# Patient Record
Sex: MALE | Race: WHITE | NOT HISPANIC OR LATINO | URBAN - METROPOLITAN AREA
[De-identification: names, ages, dates, MRNs, and addresses within clinical notes are randomized per-mention and may not be internally consistent; named-entity substitution may affect disease eponyms.]

---

## 2023-06-06 DIAGNOSIS — Z86.39 PERSONAL HISTORY OF OTHER ENDOCRINE, NUTRITIONAL AND METABOLIC DISEASE: ICD-10-CM

## 2023-06-13 RX ORDER — ATORVASTATIN CALCIUM 80 MG/1
TABLET, FILM COATED ORAL
Qty: 90 TABLET | Refills: 2 | Status: SHIPPED | OUTPATIENT
Start: 2023-06-13 | End: 2024-03-04

## 2023-07-06 DIAGNOSIS — E55.9 VITAMIN D DEFICIENCY: ICD-10-CM

## 2023-07-06 DIAGNOSIS — R31.1 BENIGN ESSENTIAL MICROSCOPIC HEMATURIA: ICD-10-CM

## 2023-07-06 DIAGNOSIS — E78.01 FAMILIAL HYPERCHOLESTEROLEMIA: Primary | ICD-10-CM

## 2023-07-06 PROBLEM — R79.89 LOW VITAMIN D LEVEL: Status: ACTIVE | Noted: 2023-07-06

## 2023-07-06 PROBLEM — K21.9 GERD (GASTROESOPHAGEAL REFLUX DISEASE): Status: ACTIVE | Noted: 2023-07-06

## 2023-07-06 PROBLEM — R31.9 HEMATURIA: Status: ACTIVE | Noted: 2023-07-06

## 2023-07-07 ENCOUNTER — LAB (OUTPATIENT)
Dept: LAB | Facility: LAB | Age: 73
End: 2023-07-07
Payer: MEDICARE

## 2023-07-07 DIAGNOSIS — R31.1 BENIGN ESSENTIAL MICROSCOPIC HEMATURIA: ICD-10-CM

## 2023-07-07 DIAGNOSIS — E55.9 VITAMIN D DEFICIENCY: ICD-10-CM

## 2023-07-07 DIAGNOSIS — E78.01 FAMILIAL HYPERCHOLESTEROLEMIA: ICD-10-CM

## 2023-07-07 LAB
ALANINE AMINOTRANSFERASE (SGPT) (U/L) IN SER/PLAS: 33 U/L (ref 10–52)
ALBUMIN (G/DL) IN SER/PLAS: 4.1 G/DL (ref 3.4–5)
ALKALINE PHOSPHATASE (U/L) IN SER/PLAS: 77 U/L (ref 33–136)
ANION GAP IN SER/PLAS: 11 MMOL/L (ref 10–20)
ASPARTATE AMINOTRANSFERASE (SGOT) (U/L) IN SER/PLAS: 33 U/L (ref 9–39)
BASOPHILS (10*3/UL) IN BLOOD BY AUTOMATED COUNT: 0.07 X10E9/L (ref 0–0.1)
BASOPHILS/100 LEUKOCYTES IN BLOOD BY AUTOMATED COUNT: 1.4 % (ref 0–2)
BILIRUBIN TOTAL (MG/DL) IN SER/PLAS: 1.5 MG/DL (ref 0–1.2)
C REACTIVE PROTEIN (MG/L) IN SER/PLAS BY HIGH SENSIT: 1.6 MG/L
CALCIDIOL (25 OH VITAMIN D3) (NG/ML) IN SER/PLAS: 76 NG/ML
CALCIUM (MG/DL) IN SER/PLAS: 9.3 MG/DL (ref 8.6–10.6)
CARBON DIOXIDE, TOTAL (MMOL/L) IN SER/PLAS: 28 MMOL/L (ref 21–32)
CHLORIDE (MMOL/L) IN SER/PLAS: 106 MMOL/L (ref 98–107)
CHOLESTEROL (MG/DL) IN SER/PLAS: 136 MG/DL (ref 0–199)
CHOLESTEROL IN HDL (MG/DL) IN SER/PLAS: 48.7 MG/DL
CHOLESTEROL/HDL RATIO: 2.8
COBALAMIN (VITAMIN B12) (PG/ML) IN SER/PLAS: 508 PG/ML (ref 211–911)
CREATININE (MG/DL) IN SER/PLAS: 1.07 MG/DL (ref 0.5–1.3)
EOSINOPHILS (10*3/UL) IN BLOOD BY AUTOMATED COUNT: 0.25 X10E9/L (ref 0–0.4)
EOSINOPHILS/100 LEUKOCYTES IN BLOOD BY AUTOMATED COUNT: 5 % (ref 0–6)
ERYTHROCYTE DISTRIBUTION WIDTH (RATIO) BY AUTOMATED COUNT: 13 % (ref 11.5–14.5)
ERYTHROCYTE MEAN CORPUSCULAR HEMOGLOBIN CONCENTRATION (G/DL) BY AUTOMATED: 34.1 G/DL (ref 32–36)
ERYTHROCYTE MEAN CORPUSCULAR VOLUME (FL) BY AUTOMATED COUNT: 90 FL (ref 80–100)
ERYTHROCYTES (10*6/UL) IN BLOOD BY AUTOMATED COUNT: 5.12 X10E12/L (ref 4.5–5.9)
GFR MALE: 73 ML/MIN/1.73M2
GLUCOSE (MG/DL) IN SER/PLAS: 92 MG/DL (ref 74–99)
HEMATOCRIT (%) IN BLOOD BY AUTOMATED COUNT: 46.3 % (ref 41–52)
HEMOGLOBIN (G/DL) IN BLOOD: 15.8 G/DL (ref 13.5–17.5)
IMMATURE GRANULOCYTES/100 LEUKOCYTES IN BLOOD BY AUTOMATED COUNT: 0.2 % (ref 0–0.9)
LDL: 66 MG/DL (ref 0–99)
LEUKOCYTES (10*3/UL) IN BLOOD BY AUTOMATED COUNT: 5 X10E9/L (ref 4.4–11.3)
LYMPHOCYTES (10*3/UL) IN BLOOD BY AUTOMATED COUNT: 1.26 X10E9/L (ref 0.8–3)
LYMPHOCYTES/100 LEUKOCYTES IN BLOOD BY AUTOMATED COUNT: 25.4 % (ref 13–44)
MONOCYTES (10*3/UL) IN BLOOD BY AUTOMATED COUNT: 0.55 X10E9/L (ref 0.05–0.8)
MONOCYTES/100 LEUKOCYTES IN BLOOD BY AUTOMATED COUNT: 11.1 % (ref 2–10)
NEUTROPHILS (10*3/UL) IN BLOOD BY AUTOMATED COUNT: 2.82 X10E9/L (ref 1.6–5.5)
NEUTROPHILS/100 LEUKOCYTES IN BLOOD BY AUTOMATED COUNT: 56.9 % (ref 40–80)
NRBC (PER 100 WBCS) BY AUTOMATED COUNT: 0 /100 WBC (ref 0–0)
PLATELETS (10*3/UL) IN BLOOD AUTOMATED COUNT: 278 X10E9/L (ref 150–450)
POTASSIUM (MMOL/L) IN SER/PLAS: 4.3 MMOL/L (ref 3.5–5.3)
PROSTATE SPECIFIC AG (NG/ML) IN SER/PLAS: 1.8 NG/ML (ref 0–4)
PROTEIN TOTAL: 6.6 G/DL (ref 6.4–8.2)
SODIUM (MMOL/L) IN SER/PLAS: 141 MMOL/L (ref 136–145)
THYROTROPIN (MIU/L) IN SER/PLAS BY DETECTION LIMIT <= 0.05 MIU/L: 3.99 MIU/L (ref 0.44–3.98)
TRIGLYCERIDE (MG/DL) IN SER/PLAS: 105 MG/DL (ref 0–149)
UREA NITROGEN (MG/DL) IN SER/PLAS: 17 MG/DL (ref 6–23)
VLDL: 21 MG/DL (ref 0–40)

## 2023-07-07 PROCEDURE — 85025 COMPLETE CBC W/AUTO DIFF WBC: CPT

## 2023-07-07 PROCEDURE — 80053 COMPREHEN METABOLIC PANEL: CPT

## 2023-07-07 PROCEDURE — 86141 C-REACTIVE PROTEIN HS: CPT

## 2023-07-07 PROCEDURE — 84443 ASSAY THYROID STIM HORMONE: CPT

## 2023-07-07 PROCEDURE — 82607 VITAMIN B-12: CPT

## 2023-07-07 PROCEDURE — 84153 ASSAY OF PSA TOTAL: CPT

## 2023-07-07 PROCEDURE — 80061 LIPID PANEL: CPT

## 2023-07-07 PROCEDURE — 36415 COLL VENOUS BLD VENIPUNCTURE: CPT

## 2023-07-07 PROCEDURE — 82306 VITAMIN D 25 HYDROXY: CPT

## 2023-07-08 PROBLEM — R97.20 ELEVATED PROSTATE SPECIFIC ANTIGEN (PSA): Status: ACTIVE | Noted: 2021-12-08

## 2023-07-08 PROBLEM — R42 VERTIGO: Status: ACTIVE | Noted: 2023-07-08

## 2023-07-08 PROBLEM — M25.569 KNEE PAIN: Status: ACTIVE | Noted: 2023-07-08

## 2023-07-08 PROBLEM — M17.11 PRIMARY OSTEOARTHRITIS OF RIGHT KNEE: Status: ACTIVE | Noted: 2023-07-08

## 2023-07-08 PROBLEM — N42.0 STONES, PROSTATE: Status: ACTIVE | Noted: 2023-02-12

## 2023-07-08 PROBLEM — R33.9 INCOMPLETE BLADDER EMPTYING: Status: ACTIVE | Noted: 2021-12-08

## 2023-07-08 RX ORDER — FAMOTIDINE 20 MG/1
20 TABLET, FILM COATED ORAL 2 TIMES DAILY
COMMUNITY
End: 2023-07-10 | Stop reason: DRUGHIGH

## 2023-07-08 RX ORDER — AZELASTINE 1 MG/ML
2 SPRAY, METERED NASAL 2 TIMES DAILY
COMMUNITY
Start: 2021-10-29 | End: 2023-07-10 | Stop reason: ALTCHOICE

## 2023-07-08 RX ORDER — TADALAFIL 20 MG/1
20 TABLET ORAL
COMMUNITY
Start: 2023-06-07

## 2023-07-08 RX ORDER — ALUMINUM ZIRCONIUM OCTACHLOROHYDREX GLY 16 G/100G
1 GEL TOPICAL DAILY
COMMUNITY
Start: 2018-05-30

## 2023-07-08 RX ORDER — ESOMEPRAZOLE MAGNESIUM 20 MG/1
1 TABLET, DELAYED RELEASE ORAL DAILY
COMMUNITY
Start: 2021-06-07

## 2023-07-08 RX ORDER — FINASTERIDE 5 MG/1
5 TABLET, FILM COATED ORAL
COMMUNITY
Start: 2023-04-06

## 2023-07-08 RX ORDER — FLUTICASONE PROPIONATE 50 MCG
2 SPRAY, SUSPENSION (ML) NASAL
COMMUNITY
Start: 2011-10-13

## 2023-07-08 RX ORDER — FAMCICLOVIR 250 MG/1
250 TABLET ORAL DAILY
COMMUNITY
Start: 2023-06-09

## 2023-07-08 NOTE — PROGRESS NOTES
Subjective   Reason for Visit: Anselmo Lin is an 72 y.o. male here for a Medicare Wellness visit.               HPI  Lab work obtained last week was reviewed.  The patient reports a history of intermittent brief episodes of discomfort in the right shoulder region over the past several months.  He reports that the episodes only occur if he tries to throw hard overhand.  He reports no associated symptoms.    He also reports a history of constant swelling located at the proximal end of the left second finger since March 2023.  He reports that the swelling developed when he heard a pop in the finger while he was pulling an object tightly.  He reports no associated pain.  He reports no dysuria, urgency, frequency, hesitancy, interruption of stream, postvoid dribbling since being treated for urinary tract infection in mid May.    Over the past year, he still reports continued chronic occasional brief episodes of sharp pain over the medial surface of the right knee.  He reports that the episodes only occur with certain movements of the leg.  He reports that the episodes resolve immediately when he changes position.  He reports no associated symptoms and no change in the frequency or intensity of the episodes over the past year.  No other new complaints.  Patient Care Team:  Andrea Javier MD as PCP - General  Andrea Javier MD as PCP [facility]     Review of Systems  All systems have been reviewed and are normal except as previously noted  Objective   Vitals:  There were no vitals taken for this visit.      Physical Exam  Head-palpation revealed no tenderness over the maxillary or frontal sinuses.   Eyes - extraocular movements intact, pupils equal and reactive to light, fundi revealed good retinal color, no hemorrhages or exudates  Ears - palpation of pinnas and traguses revealed no tenderness. External auditory canals not erythematous or swollen. TMs clear.  Nose - turbinates not erythematous or  swollen; mild nasal septal deviation noted to the left.   Mouth - posterior pharynx is erythematous, not swollen. Tonsillar pillars appeared normal no exudates  Neck - no lymphadenopathy. Thyroid gland not enlarged. , No bruits  Lungs - clear to auscultation bilaterally  Cardiac - rate normal, rhythm regular no murmurs no JVD  Abdomen - soft nondistended normal active bowel sounds.  There is a nontender 6 x 6 cm mass in the epigastrium...  Palpation revealed no tenderness or increase in warmth.  No tenderness or masses noted throughout the rest of the abdomen liver percussed to 9cm in total span  Pulses 2+ bilaterally  Extremities - no peripheral edema  Musculoskeletal  MCP and PIP joints of the left second finger.  There is mild swelling located between both joints.  No erythema noted.  No swelling in either of the joints noted.  Full range of motion in all directions of motion with no pain.  Palpation revealed no tenderness or increase in warmth  Right shoulder-no erythema or swelling.  Full range of motion in all directions of motion  with no pain.  Palpation revealed no tenderness or increase in warmth.  Negative Duffy sign, negative arm crossover test, negative impingement sign, negative Yergason sign, negative Neer's sign, negative empty can test  Right knee-no erythema or swelling. Full range of motion in all directions of motion with no pain. Palpation did reveal crepitus but no tenderness or increase in warmth. Negative Lachemann's test, negative Edmund test, negative patellar apprehension test. No pain or laxity of the collateral ligaments noted.  Skin - yellowish discoloration of right first toenail noted.  Varicose veins noted in the lower extremities bilaterally; palpation also revealed a nontender slightly movable 1 x 1 cm nodule located over the anterior surface of the thigh no increase in warmth    Neurologic  Mental status-alert and oriented x3   Cranial nerves-2 through 12 grossly intact, no  visual field abnormalities  Motor-no pronator drift noted, strength-5/5 in all muscle groups tested, , no tremor noted.  No bradykinesia noted.  No rigidity noted.  Negative pull test  Sensory-Light touch sensation fully intact  Pinprick sensation fully intact  Vibratory sensation fully intact  Cerebellar-no truncal ataxia, good coordination finger-nose testing,, good coordination heel-to-shin testing, normal rapid alternating movements  Romberg negative, no abnormality in tandem gait  Reflexes-2+/4 knee jerks, all other reflexes tested 1+/4 bilaterally  Assessment/Plan   Problem List Items Addressed This Visit    None       Assessment  Elevated CT coronary calcium score  Acute sinusitis left maxillary sinus December 2021  Chronic bilateral maxillary sinusitis December 2021  Gastroesophageal reflux  Camara's esophagus  Gastritis  Gilbert's syndrome  Chronic presence of a mass in the epigastrium-likely represents a ventral hernia  Colonic polyp August 2019  Hemorrhoids  BPH status post laser treatment October 2017; urinary retention status post greenlight photo vaporization of the prostate February 17, 2023  UTI 5/23  Urolithiasis  Nephrolithiasis  Massive acute on chronic rotator cuff tear left shoulder status post arthroscopic repair April 2017.  Long history of intermittent brief episodes of discomfort in the right shoulder region-May be secondary to osteoarthritis, labrum tear.  Labrum tear right shoulder  Osteoarthritis of the acromioclavicular joint right shoulder  Constant swelling noted between the MCP and PIP joints of the left second finger-may represent a torn flexor tendon.  Chronic intermittent brief episodes of sharp pain noted over the medial surface of the right knee-may be secondary to osteoarthritis  Osteoarthritis of the right knee  Anterior cruciate ligament tear right knee, meniscus tear right knee-remote  Vitamin D deficiency-mild  Chronic presence of a small mass located over the anterior  surface of the right thigh-may represent a cyst or a prominent varicose vein  Hyperlipidemia  Varicose veins  Bilateral neuroforaminal stenosis L1-L2, L4-L5, L5-S1  Degenerative disc disease of the lumbosacral spine  Onychomycosis of the right first toenail    Plan  The patient and I have elected to hold off on any referral to a hand specialist as he has experienced no episodes of pain in the left second finger since the injury in March.  Continue current medication regimen for now.  Patient should return for annual Medicare wellness visit in 1 year

## 2023-07-10 ENCOUNTER — OFFICE VISIT (OUTPATIENT)
Dept: PRIMARY CARE | Facility: CLINIC | Age: 73
End: 2023-07-10
Payer: MEDICARE

## 2023-07-10 VITALS
DIASTOLIC BLOOD PRESSURE: 80 MMHG | WEIGHT: 153 LBS | SYSTOLIC BLOOD PRESSURE: 110 MMHG | HEART RATE: 66 BPM | BODY MASS INDEX: 25.46 KG/M2

## 2023-07-10 DIAGNOSIS — Z00.00 ROUTINE GENERAL MEDICAL EXAMINATION AT HEALTH CARE FACILITY: ICD-10-CM

## 2023-07-10 DIAGNOSIS — E78.01 FAMILIAL HYPERCHOLESTEROLEMIA: Primary | ICD-10-CM

## 2023-07-10 DIAGNOSIS — G89.29 CHRONIC PAIN OF RIGHT KNEE: ICD-10-CM

## 2023-07-10 DIAGNOSIS — R31.1 BENIGN ESSENTIAL MICROSCOPIC HEMATURIA: ICD-10-CM

## 2023-07-10 DIAGNOSIS — K21.9 GASTROESOPHAGEAL REFLUX DISEASE WITHOUT ESOPHAGITIS: ICD-10-CM

## 2023-07-10 DIAGNOSIS — N13.8 BENIGN PROSTATIC HYPERPLASIA WITH URINARY OBSTRUCTION: ICD-10-CM

## 2023-07-10 DIAGNOSIS — M25.561 CHRONIC PAIN OF RIGHT KNEE: ICD-10-CM

## 2023-07-10 DIAGNOSIS — N40.1 BENIGN PROSTATIC HYPERPLASIA WITH URINARY OBSTRUCTION: ICD-10-CM

## 2023-07-10 PROCEDURE — 1170F FXNL STATUS ASSESSED: CPT | Performed by: INTERNAL MEDICINE

## 2023-07-10 PROCEDURE — 99212 OFFICE O/P EST SF 10 MIN: CPT | Performed by: INTERNAL MEDICINE

## 2023-07-10 PROCEDURE — 1159F MED LIST DOCD IN RCRD: CPT | Performed by: INTERNAL MEDICINE

## 2023-07-10 PROCEDURE — 1160F RVW MEDS BY RX/DR IN RCRD: CPT | Performed by: INTERNAL MEDICINE

## 2023-07-10 PROCEDURE — 1157F ADVNC CARE PLAN IN RCRD: CPT | Performed by: INTERNAL MEDICINE

## 2023-07-10 RX ORDER — FAMOTIDINE 20 MG/1
20 TABLET, FILM COATED ORAL NIGHTLY
Qty: 90 TABLET | Refills: 3 | Status: SHIPPED | OUTPATIENT
Start: 2023-07-10

## 2023-07-10 ASSESSMENT — ENCOUNTER SYMPTOMS
LOSS OF SENSATION IN FEET: 0
OCCASIONAL FEELINGS OF UNSTEADINESS: 0
DEPRESSION: 0

## 2023-07-10 ASSESSMENT — ACTIVITIES OF DAILY LIVING (ADL)
MANAGING_FINANCES: INDEPENDENT
GROCERY_SHOPPING: INDEPENDENT
DRESSING: INDEPENDENT
DOING_HOUSEWORK: INDEPENDENT
BATHING: INDEPENDENT
TAKING_MEDICATION: INDEPENDENT

## 2023-11-09 ENCOUNTER — OFFICE VISIT (OUTPATIENT)
Dept: DERMATOLOGY | Facility: CLINIC | Age: 73
End: 2023-11-09
Payer: MEDICARE

## 2023-11-09 DIAGNOSIS — L57.0 ACTINIC KERATOSIS: ICD-10-CM

## 2023-11-09 PROCEDURE — 17000 DESTRUCT PREMALG LESION: CPT | Performed by: SPECIALIST

## 2023-11-09 PROCEDURE — 1159F MED LIST DOCD IN RCRD: CPT | Performed by: SPECIALIST

## 2023-11-09 PROCEDURE — 17003 DESTRUCT PREMALG LES 2-14: CPT | Performed by: SPECIALIST

## 2023-11-09 PROCEDURE — 1160F RVW MEDS BY RX/DR IN RCRD: CPT | Performed by: SPECIALIST

## 2023-11-09 NOTE — PROGRESS NOTES
Subjective     Anselmo Lin is a 73 y.o. male who presents for the following: Actinic Keratosis (Scalp ).     Review of Systems:  No other skin or systemic complaints other than what is documented elsewhere in the note.    The following portions of the chart were reviewed this encounter and updated as appropriate:          Skin Cancer History  No skin cancer on file.      Specialty Problems    None       Objective   Well appearing patient in no apparent distress; mood and affect are within normal limits.    A focused skin examination was performed. All findings within normal limits unless otherwise noted below.    Assessment/Plan   1. Actinic keratosis  Scalp

## 2023-11-09 NOTE — PROGRESS NOTES
Subjective   HPI: Anselmo Lin is a 73 y.o. male is here for evaluation and treatment of sensitive spots on my scalp.    ROS: No other skin or systemic complaints other than what is documented elsewhere in the note.    ALLERGIES: Patient has no known allergies.    SOCIAL:  reports that he has never smoked. He has never used smokeless tobacco. He reports current alcohol use of about 2.0 - 3.0 standard drinks of alcohol per week. No history on file for drug use.    Objective     Description: Erythematous scaly sensitive lesions in a photodamaged area.    Assessment/Plan   1. Actinic keratosis  Scalp         FOLLOW UP: 6 months.    The patient was encouraged to contact me with any further questions or concerns.  Olayinka Tuttle MD  11/9/2023

## 2023-12-05 DIAGNOSIS — L30.8 OTHER ECZEMA: Primary | ICD-10-CM

## 2023-12-05 RX ORDER — BETAMETHASONE VALERATE 1 MG/G
CREAM TOPICAL 2 TIMES DAILY
Qty: 45 G | Refills: 0 | Status: SHIPPED | OUTPATIENT
Start: 2023-12-05

## 2024-01-31 ENCOUNTER — TELEPHONE (OUTPATIENT)
Dept: PRIMARY CARE | Facility: CLINIC | Age: 74
End: 2024-01-31
Payer: MEDICARE

## 2024-02-24 NOTE — PROGRESS NOTES
Priscilla Lawton MD   Adult Reconstruction and Joint Replacement Surgery  Phone: [phone]     Fax: [phone]     INITIAL CONSULTATION    Name: Anselmo Lin  : 1950  Date of Visit:  2024    CC: Right knee pain    Clinical History:  Anselmo Lin is a 73 y.o. male who presents with several years of RIGHT knee pain. They were referred by another patient of mine.    Patient has tried the following Ice, NSAIDs, Activity modification, Physical therapy, Xray, and Surgery . Date of last steroid injection: never. Patient does not have pain at night. Patient is able to walk unlimited blocks. Patient is currently using nothing as assistive device. Primarily complains of diffuse and medial pain. Patient has difficulty with descending stairs and walking on unlevel surfaces . The pain is significantly impacting his ability to perform activities of daily living. Patient reports no longer able to do activities such as descending stairs and keeping his knee stable.     He has a remote history of right tib fib fx s/p surgical fixation and subsequent malrotation. Also had a football injury with meniscal pathology and underwent meniscal procedure (meniscectomy, left ACL tear, at 38 yo. This did help knee giving out on him. Goes to gym every day to try keep up his strength.     Focused History  PMH: Reviewed and PE/DVT: no  PSH: Reviewed , Hip/Knee replacement: no, Hip/Knee surgery: R knee meniscectomy partial at 38 yo, Anesthesia complications: no, Spine surgery: no, Surgical infection: no, and Weight loss surgery: no  Meds: Reviewed, Current Anticoagulants: no, Weight loss medication: no, and Current Opioids: no  Allergies: Reviewed  and The patient reports no contraindications or allergies to cephalosporins, aspirin, NSAIDs or opioids, except as noted above.  FH: No family history of any bleeding or clotting disorders.  SHx: Reviewed, Occupation: retired, owned steel company locally, Current smoker: no, EtOH  intake weekly: n/a, Social support: n/a, and Preferred physical activities: staying fit, gym     PROMs   None     Past Medical History:   Diagnosis Date    Gilbert syndrome 06/08/2022    Gilbert's syndrome    Personal history of other diseases of male genital organs 06/08/2022    History of benign prostatic hyperplasia    Personal history of other diseases of the digestive system 02/03/2021    History of esophageal reflux    Personal history of other diseases of the respiratory system 10/29/2021    H/O acute sinusitis    Personal history of other endocrine, nutritional and metabolic disease 06/08/2022    History of hyperlipidemia     Documented in chart and reviewed.     No past surgical history on file.    Allergies: He has No Known Allergies.     Medications:  Current Outpatient Medications   Medication Instructions    atorvastatin (Lipitor) 80 mg tablet TAKE 1 TABLET BY MOUTH EVERY DAY    betamethasone valerate (Valisone) 0.1 % cream Topical, 2 times daily, To active areas as directed    Bifidobacterium infantis (Align) 4 mg capsule 1 capsule, oral, Daily    esomeprazole magnesium (NexIUM 24HR) 20 mg tablet,delayed release (DR/EC) 1 tablet, oral, Daily    famciclovir (FAMVIR) 250 mg, oral, Daily    famotidine (PEPCID) 20 mg, oral, Nightly    finasteride (PROSCAR) 5 mg, oral, Daily RT    fish oil concentrate (Omega-3) 120-180 mg capsule 1 capsule, oral, Daily    fluticasone (Flonase) 50 mcg/actuation nasal spray 2 sprays, Does not apply, Daily RT    tadalafil (CIALIS) 20 mg, oral       No family history on file.  Documented in chart and reviewed.     Social History     Tobacco Use    Smoking status: Never    Smokeless tobacco: Never   Substance Use Topics    Alcohol use: Yes     Alcohol/week: 2.0 - 3.0 standard drinks of alcohol     Types: 2 - 3 Standard drinks or equivalent per week       Review of Systems: Review of systems completed with medical assistant intake. Please refer to this note.     Falls: The  patient denies any recent falls or fall-related injuries.    Physical Exam:  BMI: Not determined    Constitutional: The patient is well-appearing and well groomed.     Neurological/Psychiatric: The patient is alert and oriented to person, place and time. The patient has a normal mood and affect.    Skin Examination: The skin over the right lower extremity, left lower extremity, right upper extremity, and left upper extremity is intact without any evidence of infection or rash.    Cardiovascular Examination: There are no varicosities and the skin is normal temperature, capillary refill normal, arterial pulses normal, no edema.    Lymphatic Examination: There is no lymphatic swelling or palpable lymph nodes present around the involved joint.    Neurological Examination: Bilateral lower extremities are grossly neurologically intact. Sensation normal, motor function normal.    Gait: The patient ambulates with an antalgic gait.     Lumbar spine:    No tenderness to palpation midline.    Negative straight leg raise bilaterally.    Right Hip Examination:  The skin is intact over the hip.    There is no tenderness over the greater trochanter.    Range of motion is full extension to 100 degrees of flexion.    The hip is stable without subluxation or dislocation.    The hip internally rotates to 15 degrees and externally rotates to 45 degrees.    There is no pain with hip motion.    Left Hip Examination:  The skin is intact over the hip.    There is no tenderness over the greater trochanter.    Range of motion is full extension to 100 degrees of flexion.    The hip is stable without subluxation or dislocation.    The hip internally rotates to 15 degrees and externally rotates to 45 degrees.    There is no pain with hip motion.    Right Knee Examination:  Examination of the knee reveals the skin to be intact.    There is a small effusion in the knee.    The alignment of the knee is Varus.    This deformity is not  "correctable.    There is tenderness to palpation over the joint line.    There is significant quadriceps atrophy.    Range of Motion: 15 to 115 degrees of flexion.    The knee is stable to varus-valgus stress and anterior-posterior stress with 4-5 mm of anterior laxity.     There is severe grinding with range of motion.    There is severe patellofemoral crepitus.    Left Knee Examination:  Examination of the knee reveals the skin to be intact.     There is no obvious swelling.    There is a no effusion in the knee.     The alignment of the knee is normal.    There is no tenderness to palpation over the joint line.    There is no significant quadriceps atrophy.    Range of motion is full extension to 120 degrees of flexion.    The knee is stable to varus-valgus stress and anterior-posterior stress.     There is moderate grinding with range of motion.    There is moderate patellofemoral crepitus.    Prior Labs:   Lab Results   Component Value Date    WBC 5.0 07/07/2023    HGB 15.8 07/07/2023    HCT 46.3 07/07/2023    MCV 90 07/07/2023     07/07/2023      No results found for: \"INR\", \"PROTIME\"      Lab Results   Component Value Date    GLUCOSE 92 07/07/2023    CALCIUM 9.3 07/07/2023     07/07/2023    K 4.3 07/07/2023    CO2 28 07/07/2023     07/07/2023    BUN 17 07/07/2023    CREATININE 1.07 07/07/2023      No results found for: \"CKTOTAL\", \"CKMB\", \"CKMBINDEX\", \"TROPONINI\"   No results found for: \"HGBA1C\"      No results found for: \"CRP\"   No results found for: \"SEDRATE\"     Radiographs:  Radiographs were personally reviewed today. There is evidence of severe RIGHT  knee osteoarthritis with MEDIAL and LATERAL  bone on bone apposition.    Impression:  73 y.o. male presents with severe RIGHT  knee osteoarthritis with bone on bone apposition. Patient has tried and failed appropriate conservative measures and now has limitation in ADL's.     Diagnosis:  Primary osteoarthritis of right " knee    Recommendations / Plan:    I have discussed the options in detail with the patient. We have discussed anti-inflammatory medication, activity modification, physical therapy, corticosteroid injections, viscosupplementation injections, partial knee replacement surgery and total knee replacement surgery.    The risks and benefits of all these treatment options have been discussed in detail. The patient has tried at least 3 months of the above conservative treatments and continues to have disabling pain, impaired activities of daily living and worsened quality of life.  At this point the patient continues to proceed with nonsurgical management of his arthritis.  He will determine at which point he feels ready to proceed with a knee replacement surgery.  Discussed that this is an elective surgery and the timing can be variable depending on his symptomatic relief from nonsurgical treatment regimen.  Reviewed the surgical optimization steps to optimize their chances for a successful joint replacement surgery. Encouraged them to maintain range of motion and strength around the knee joints.  They will continue to implement these strategies in addressing their pain.  A physical therapy prescription was provided for formal exercises to address knee arthritis pain.  He will also continue his very regular home exercise program.    Recommend the patient continue optimizing nonsurgical treatment interventions as outlined above for management of their knee arthritis.  I would be happy to see them again at any point to discuss surgery if they are more optimized or to review nonsurgical treatment management..  The patient verbalizes understanding with the recommendations and treatment plan as outlined above and is in agreement.  Questions were addressed.    _____________  Priscilla Lawton MD   [facility]     Approximately 45 minutes were spent on the following tasks:               Preparing for the patient              Reviewing medical records              Taking a patient history              Performing a physical exam              Reviewing treatment options with the patient              Explaining the risks, potential benefits, and alternative to surgery  Explaining the expected rehabilitation after each treatment option  Explaining the potential long term expectations  Evaluating the diagnostic imaging     This office note was transcribed with dictation software.  Please excuse any typographical errors, program misunderstandings leading to inadvertent insertions or deletions of inappropriate wording, pronoun errors and other unintentional transcription errors not noticed on proof-reading.

## 2024-02-28 ENCOUNTER — HOSPITAL ENCOUNTER (OUTPATIENT)
Dept: RADIOLOGY | Facility: CLINIC | Age: 74
Discharge: HOME | End: 2024-02-28
Payer: MEDICARE

## 2024-02-28 ENCOUNTER — OFFICE VISIT (OUTPATIENT)
Dept: ORTHOPEDIC SURGERY | Facility: CLINIC | Age: 74
End: 2024-02-28
Payer: MEDICARE

## 2024-02-28 DIAGNOSIS — M17.11 PRIMARY OSTEOARTHRITIS OF RIGHT KNEE: Primary | ICD-10-CM

## 2024-02-28 DIAGNOSIS — M17.11 UNILATERAL PRIMARY OSTEOARTHRITIS, RIGHT KNEE: ICD-10-CM

## 2024-02-28 PROCEDURE — 99214 OFFICE O/P EST MOD 30 MIN: CPT | Performed by: STUDENT IN AN ORGANIZED HEALTH CARE EDUCATION/TRAINING PROGRAM

## 2024-02-28 PROCEDURE — 1157F ADVNC CARE PLAN IN RCRD: CPT | Performed by: STUDENT IN AN ORGANIZED HEALTH CARE EDUCATION/TRAINING PROGRAM

## 2024-02-28 PROCEDURE — 1125F AMNT PAIN NOTED PAIN PRSNT: CPT | Performed by: STUDENT IN AN ORGANIZED HEALTH CARE EDUCATION/TRAINING PROGRAM

## 2024-02-28 PROCEDURE — 73562 X-RAY EXAM OF KNEE 3: CPT | Mod: RT

## 2024-02-28 PROCEDURE — 73560 X-RAY EXAM OF KNEE 1 OR 2: CPT | Mod: RIGHT SIDE | Performed by: RADIOLOGY

## 2024-02-28 PROCEDURE — 73562 X-RAY EXAM OF KNEE 3: CPT | Mod: RIGHT SIDE | Performed by: RADIOLOGY

## 2024-02-28 PROCEDURE — 1159F MED LIST DOCD IN RCRD: CPT | Performed by: STUDENT IN AN ORGANIZED HEALTH CARE EDUCATION/TRAINING PROGRAM

## 2024-02-28 PROCEDURE — 73560 X-RAY EXAM OF KNEE 1 OR 2: CPT | Mod: LT

## 2024-02-28 ASSESSMENT — PAIN DESCRIPTION - DESCRIPTORS: DESCRIPTORS: DULL;ACHING

## 2024-02-28 ASSESSMENT — PAIN - FUNCTIONAL ASSESSMENT: PAIN_FUNCTIONAL_ASSESSMENT: 0-10

## 2024-02-28 ASSESSMENT — PAIN SCALES - GENERAL: PAINLEVEL_OUTOF10: 2

## 2024-03-03 DIAGNOSIS — Z86.39 PERSONAL HISTORY OF OTHER ENDOCRINE, NUTRITIONAL AND METABOLIC DISEASE: ICD-10-CM

## 2024-03-04 RX ORDER — ATORVASTATIN CALCIUM 80 MG/1
TABLET, FILM COATED ORAL
Qty: 90 TABLET | Refills: 2 | Status: SHIPPED | OUTPATIENT
Start: 2024-03-04

## 2024-07-02 ENCOUNTER — LAB (OUTPATIENT)
Dept: LAB | Facility: LAB | Age: 74
End: 2024-07-02
Payer: MEDICARE

## 2024-07-02 DIAGNOSIS — N40.1 BENIGN PROSTATIC HYPERPLASIA WITH URINARY OBSTRUCTION: ICD-10-CM

## 2024-07-02 DIAGNOSIS — N13.8 BENIGN PROSTATIC HYPERPLASIA WITH URINARY OBSTRUCTION: ICD-10-CM

## 2024-07-02 DIAGNOSIS — R31.1 BENIGN ESSENTIAL MICROSCOPIC HEMATURIA: ICD-10-CM

## 2024-07-02 DIAGNOSIS — Z00.00 ROUTINE GENERAL MEDICAL EXAMINATION AT A HEALTH CARE FACILITY: ICD-10-CM

## 2024-07-02 DIAGNOSIS — E78.01 FAMILIAL HYPERCHOLESTEROLEMIA: ICD-10-CM

## 2024-07-02 LAB
ALBUMIN SERPL BCP-MCNC: 4.4 G/DL (ref 3.4–5)
ALP SERPL-CCNC: 74 U/L (ref 33–136)
ALT SERPL W P-5'-P-CCNC: 28 U/L (ref 10–52)
ANION GAP SERPL CALC-SCNC: 14 MMOL/L (ref 10–20)
AST SERPL W P-5'-P-CCNC: 29 U/L (ref 9–39)
BASOPHILS # BLD AUTO: 0.07 X10*3/UL (ref 0–0.1)
BASOPHILS NFR BLD AUTO: 1.2 %
BILIRUB SERPL-MCNC: 1.7 MG/DL (ref 0–1.2)
BUN SERPL-MCNC: 20 MG/DL (ref 6–23)
CALCIUM SERPL-MCNC: 9.4 MG/DL (ref 8.6–10.6)
CHLORIDE SERPL-SCNC: 102 MMOL/L (ref 98–107)
CHOLEST SERPL-MCNC: 143 MG/DL (ref 0–199)
CHOLESTEROL/HDL RATIO: 2.7
CO2 SERPL-SCNC: 27 MMOL/L (ref 21–32)
CREAT SERPL-MCNC: 1.09 MG/DL (ref 0.5–1.3)
CRP SERPL HS-MCNC: 1.8 MG/L
EGFRCR SERPLBLD CKD-EPI 2021: 71 ML/MIN/1.73M*2
EOSINOPHIL # BLD AUTO: 0.34 X10*3/UL (ref 0–0.4)
EOSINOPHIL NFR BLD AUTO: 5.7 %
ERYTHROCYTE [DISTWIDTH] IN BLOOD BY AUTOMATED COUNT: 13.8 % (ref 11.5–14.5)
GLUCOSE SERPL-MCNC: 96 MG/DL (ref 74–99)
HCT VFR BLD AUTO: 45.4 % (ref 41–52)
HDLC SERPL-MCNC: 52.5 MG/DL
HGB BLD-MCNC: 15.9 G/DL (ref 13.5–17.5)
IMM GRANULOCYTES # BLD AUTO: 0.02 X10*3/UL (ref 0–0.5)
IMM GRANULOCYTES NFR BLD AUTO: 0.3 % (ref 0–0.9)
LDLC SERPL CALC-MCNC: 71 MG/DL
LYMPHOCYTES # BLD AUTO: 1.05 X10*3/UL (ref 0.8–3)
LYMPHOCYTES NFR BLD AUTO: 17.7 %
MCH RBC QN AUTO: 31.9 PG (ref 26–34)
MCHC RBC AUTO-ENTMCNC: 35 G/DL (ref 32–36)
MCV RBC AUTO: 91 FL (ref 80–100)
MONOCYTES # BLD AUTO: 0.59 X10*3/UL (ref 0.05–0.8)
MONOCYTES NFR BLD AUTO: 10 %
NEUTROPHILS # BLD AUTO: 3.85 X10*3/UL (ref 1.6–5.5)
NEUTROPHILS NFR BLD AUTO: 65.1 %
NON HDL CHOLESTEROL: 91 MG/DL (ref 0–149)
NRBC BLD-RTO: 0 /100 WBCS (ref 0–0)
PLATELET # BLD AUTO: 265 X10*3/UL (ref 150–450)
POTASSIUM SERPL-SCNC: 4.2 MMOL/L (ref 3.5–5.3)
PROT SERPL-MCNC: 6.9 G/DL (ref 6.4–8.2)
PSA SERPL-MCNC: 2.61 NG/ML
RBC # BLD AUTO: 4.99 X10*6/UL (ref 4.5–5.9)
SODIUM SERPL-SCNC: 139 MMOL/L (ref 136–145)
TRIGL SERPL-MCNC: 96 MG/DL (ref 0–149)
VIT B12 SERPL-MCNC: 493 PG/ML (ref 211–911)
VLDL: 19 MG/DL (ref 0–40)
WBC # BLD AUTO: 5.9 X10*3/UL (ref 4.4–11.3)

## 2024-07-02 PROCEDURE — 82607 VITAMIN B-12: CPT

## 2024-07-02 PROCEDURE — 80053 COMPREHEN METABOLIC PANEL: CPT

## 2024-07-02 PROCEDURE — 84153 ASSAY OF PSA TOTAL: CPT

## 2024-07-02 PROCEDURE — 85025 COMPLETE CBC W/AUTO DIFF WBC: CPT

## 2024-07-02 PROCEDURE — 36415 COLL VENOUS BLD VENIPUNCTURE: CPT

## 2024-07-02 PROCEDURE — 80061 LIPID PANEL: CPT

## 2024-07-02 PROCEDURE — 86141 C-REACTIVE PROTEIN HS: CPT

## 2024-07-10 NOTE — PROGRESS NOTES
Subjective   Reason for Visit: Anselmo Lin is an 73 y.o. male here for a Medicare Wellness visit.               HPI  Labs obtained July 2 were reviewed.  The patient reports no recent episodes of pain in the right knee.  Over the past year, he reports no swelling at the distal end of the left second finger  No other new complaints.  Patient Care Team:  Andrea Javier MD as PCP - General (Internal Medicine)  Andrea Javier MD as PCP - [facility] Attributed Provider     Review of Systems  All systems have been reviewed and are normal except as previously noted  Objective   Vitals:  There were no vitals taken for this visit.      Physical Exam  Head-palpation revealed no tenderness over the maxillary or frontal sinuses.   Eyes - extraocular movements intact, pupils equal and reactive to light, fundi revealed good retinal color, no hemorrhages or exudates  Ears - palpation of pinnas and traguses revealed no tenderness. External auditory canals not erythematous or swollen. TMs clear.  Nose - turbinates not erythematous or swollen; mild nasal septal deviation noted to the left.   Mouth - posterior pharynx is erythematous, not swollen. Tonsillar pillars appeared normal no exudates  Neck - no lymphadenopathy. Thyroid gland not enlarged. , No bruits  Lungs - clear to auscultation bilaterally  Cardiac - rate normal, rhythm regular no murmurs no JVD  Abdomen - soft nondistended normal active bowel sounds.  There is a nontender 6 x 6 cm mass in the epigastrium...  Palpation revealed no tenderness or increase in warmth.  No tenderness or masses noted throughout the rest of the abdomen liver percussed to 9cm in total span  Pulses 2+ bilaterally  Extremities - no peripheral edema  Musculoskeletal    Right shoulder-no erythema or swelling.  Full range of motion in all directions of motion  with mild pain on internal rotation and abduction 90 degrees.  Full range of motion with no pain noted in all other directions of motion.   Palpation revealed no tenderness or increase in warmth.  Negative Duffy sign, negative arm crossover test, negative impingement sign, negative Yergason sign, negative Neer's sign, negative empty can test  Right knee-no erythema or swelling. Full range of motion in all directions of motion with no pain. Palpation did reveal crepitus but no tenderness or increase in warmth. Negative Lachemann's test, negative Edmund test, negative patellar apprehension test. No pain or laxity of the collateral ligaments noted.  Skin - yellowish discoloration of right first toenail noted.  Varicose veins noted in the lower extremities bilaterally; palpation also revealed a nontender slightly movable 1 x 1 cm nodule located over the anterior surface of the right thigh ;no increase in warmth     Neurologic  Mental status-alert and oriented x3   Cranial nerves-2 through 12 grossly intact, no visual field abnormalities  Motor-no pronator drift noted, strength-5/5 in all muscle groups tested, , no tremor noted.  No bradykinesia noted.  No rigidity noted.  Negative pull test  Sensory-Light touch sensation fully intact  Pinprick sensation fully intact  Vibratory sensation fully intact  Cerebellar-no truncal ataxia, good coordination finger-nose testing,, good coordination heel-to-shin testing, normal rapid alternating movements  Romberg negative, no abnormality in tandem gait  Reflexes-2+/4 knee jerks, all other reflexes tested 1+/4 bilaterally  Assessment/Plan   Elevated CT coronary calcium score  Gastroesophageal reflux  Camara's esophagus  Urolithiasis, nephrolithiasis, rotator cuff tear left shoulder  Hyperlipidemia  Osteoarthritis of the right knee      Assessment  Elevated CT coronary calcium score  Acute sinusitis left maxillary sinus December 2021  Chronic bilateral maxillary sinusitis December 2021  Gastroesophageal reflux  Camara's esophagus  Gastritis  Gilbert's syndrome  Chronic presence of a mass in the epigastrium-likely  represents a ventral hernia  Colonic polyp August 2019  Hemorrhoids  BPH status post laser treatment October 2017; urinary retention status post greenlight photo vaporization of the prostate February 17, 2023  UTI 5/23  Urolithiasis  Nephrolithiasis  Massive acute on chronic rotator cuff tear left shoulder status post arthroscopic repair April 2017.  Chronic intermittent brief episodes of discomfort in the right shoulder region-May be secondary to osteoarthritis, labrum tear.  Labrum tear right shoulder  Osteoarthritis of the acromioclavicular joint right shoulder  Chronic intermittent brief episodes of sharp pain noted over the medial surface of the right knee-may be secondary to osteoarthritis  Osteoarthritis of the right knee  Anterior cruciate ligament tear right knee, meniscus tear right knee-remote  Vitamin D deficiency-mild  Chronic presence of a small mass located over the anterior surface of the right thigh-may represent a cyst or a prominent varicose vein  Hyperlipidemia  Varicose veins  Bilateral neuroforaminal stenosis L1-L2, L4-L5, L5-S1  Degenerative disc disease of the lumbosacral spine  Onychomycosis of the right first toenail    Plan  Obtain EKG today  Begin aspirin 81 mg p.o. daily and continue all other current medications and supplements  Patient should return for annual Medicare wellness visit in 1 year

## 2024-07-12 ENCOUNTER — APPOINTMENT (OUTPATIENT)
Dept: PRIMARY CARE | Facility: CLINIC | Age: 74
End: 2024-07-12
Payer: MEDICARE

## 2024-07-12 VITALS
WEIGHT: 152.4 LBS | BODY MASS INDEX: 25.36 KG/M2 | HEART RATE: 66 BPM | SYSTOLIC BLOOD PRESSURE: 120 MMHG | DIASTOLIC BLOOD PRESSURE: 80 MMHG

## 2024-07-12 DIAGNOSIS — M48.061 SPINAL STENOSIS, LUMBAR REGION, WITHOUT NEUROGENIC CLAUDICATION: ICD-10-CM

## 2024-07-12 DIAGNOSIS — Z00.00 ROUTINE GENERAL MEDICAL EXAMINATION AT A HEALTH CARE FACILITY: ICD-10-CM

## 2024-07-12 DIAGNOSIS — N13.8 BENIGN PROSTATIC HYPERPLASIA WITH URINARY OBSTRUCTION: ICD-10-CM

## 2024-07-12 DIAGNOSIS — K21.9 GASTROESOPHAGEAL REFLUX DISEASE WITHOUT ESOPHAGITIS: Primary | ICD-10-CM

## 2024-07-12 DIAGNOSIS — R97.20 ELEVATED PROSTATE SPECIFIC ANTIGEN (PSA): ICD-10-CM

## 2024-07-12 DIAGNOSIS — E78.01 FAMILIAL HYPERCHOLESTEROLEMIA: ICD-10-CM

## 2024-07-12 DIAGNOSIS — R79.89 LOW VITAMIN D LEVEL: ICD-10-CM

## 2024-07-12 DIAGNOSIS — N40.1 BENIGN PROSTATIC HYPERPLASIA WITH URINARY OBSTRUCTION: ICD-10-CM

## 2024-07-12 PROCEDURE — 1157F ADVNC CARE PLAN IN RCRD: CPT | Performed by: INTERNAL MEDICINE

## 2024-07-12 PROCEDURE — 1159F MED LIST DOCD IN RCRD: CPT | Performed by: INTERNAL MEDICINE

## 2024-07-12 PROCEDURE — 1170F FXNL STATUS ASSESSED: CPT | Performed by: INTERNAL MEDICINE

## 2024-07-12 PROCEDURE — 1160F RVW MEDS BY RX/DR IN RCRD: CPT | Performed by: INTERNAL MEDICINE

## 2024-07-12 PROCEDURE — G0439 PPPS, SUBSEQ VISIT: HCPCS | Performed by: INTERNAL MEDICINE

## 2024-07-12 PROCEDURE — 93000 ELECTROCARDIOGRAM COMPLETE: CPT | Performed by: INTERNAL MEDICINE

## 2024-07-12 ASSESSMENT — ENCOUNTER SYMPTOMS
DEPRESSION: 0
LOSS OF SENSATION IN FEET: 0
OCCASIONAL FEELINGS OF UNSTEADINESS: 0

## 2024-07-12 ASSESSMENT — ACTIVITIES OF DAILY LIVING (ADL)
DOING_HOUSEWORK: INDEPENDENT
MANAGING_FINANCES: INDEPENDENT
BATHING: INDEPENDENT
TAKING_MEDICATION: INDEPENDENT
DRESSING: INDEPENDENT
GROCERY_SHOPPING: INDEPENDENT

## 2024-11-21 DIAGNOSIS — Z86.39 PERSONAL HISTORY OF OTHER ENDOCRINE, NUTRITIONAL AND METABOLIC DISEASE: ICD-10-CM

## 2024-11-21 RX ORDER — ATORVASTATIN CALCIUM 80 MG/1
TABLET, FILM COATED ORAL
Qty: 90 TABLET | Refills: 2 | Status: SHIPPED | OUTPATIENT
Start: 2024-11-21

## 2025-06-16 DIAGNOSIS — J40 BRONCHITIS: Primary | ICD-10-CM

## 2025-06-16 RX ORDER — AMOXICILLIN AND CLAVULANATE POTASSIUM 875; 125 MG/1; MG/1
875 TABLET, FILM COATED ORAL 2 TIMES DAILY
Qty: 20 TABLET | Refills: 0 | Status: SHIPPED | OUTPATIENT
Start: 2025-06-16 | End: 2025-06-26

## 2025-06-16 NOTE — PROGRESS NOTES
The patient called to report a history of a cough productive of green sputum x 8 days.  He reports no associated symptoms.  He reports that the cough developed the day that he and his wife came home from an [place] cruise.  The patient may have a viral syndrome, bronchitis, sinusitis.  I have prescribed Augmentin to be used at a dose of 875 mg p.o. twice daily x 10 days.  I also have prescribed Mucinex DM maximum strength 1 tablet p.o. twice daily as needed cough.  The patient will continue his daily use of Flonase.  He will call me in 10 days with his condition

## 2025-07-15 DIAGNOSIS — N40.1 BENIGN PROSTATIC HYPERPLASIA WITH URINARY OBSTRUCTION: ICD-10-CM

## 2025-07-15 DIAGNOSIS — E55.9 VITAMIN D DEFICIENCY: ICD-10-CM

## 2025-07-15 DIAGNOSIS — R31.1 BENIGN ESSENTIAL MICROSCOPIC HEMATURIA: ICD-10-CM

## 2025-07-15 DIAGNOSIS — E78.01 FAMILIAL HYPERCHOLESTEROLEMIA: ICD-10-CM

## 2025-07-15 DIAGNOSIS — R97.20 ELEVATED PROSTATE SPECIFIC ANTIGEN (PSA): ICD-10-CM

## 2025-07-15 DIAGNOSIS — Z00.00 ROUTINE GENERAL MEDICAL EXAMINATION AT A HEALTH CARE FACILITY: Primary | ICD-10-CM

## 2025-07-15 DIAGNOSIS — N13.8 BENIGN PROSTATIC HYPERPLASIA WITH URINARY OBSTRUCTION: ICD-10-CM

## 2025-07-17 LAB
25(OH)D3+25(OH)D2 SERPL-MCNC: 85 NG/ML (ref 30–100)
ALBUMIN SERPL-MCNC: 4.7 G/DL (ref 3.6–5.1)
ALP SERPL-CCNC: 80 U/L (ref 35–144)
ALT SERPL-CCNC: 29 U/L (ref 9–46)
ANION GAP SERPL CALCULATED.4IONS-SCNC: 9 MMOL/L (CALC) (ref 7–17)
AST SERPL-CCNC: 28 U/L (ref 10–35)
BASOPHILS # BLD AUTO: 78 CELLS/UL (ref 0–200)
BASOPHILS NFR BLD AUTO: 1.3 %
BILIRUB SERPL-MCNC: 1.3 MG/DL (ref 0.2–1.2)
BUN SERPL-MCNC: 19 MG/DL (ref 7–25)
CALCIUM SERPL-MCNC: 9.3 MG/DL (ref 8.6–10.3)
CHLORIDE SERPL-SCNC: 103 MMOL/L (ref 98–110)
CHOLEST SERPL-MCNC: 155 MG/DL
CHOLEST/HDLC SERPL: 2.7 (CALC)
CO2 SERPL-SCNC: 28 MMOL/L (ref 20–32)
CREAT SERPL-MCNC: 1.19 MG/DL (ref 0.7–1.28)
CRP SERPL HS-MCNC: 1 MG/L
EGFRCR SERPLBLD CKD-EPI 2021: 64 ML/MIN/1.73M2
EOSINOPHIL # BLD AUTO: 450 CELLS/UL (ref 15–500)
EOSINOPHIL NFR BLD AUTO: 7.5 %
ERYTHROCYTE [DISTWIDTH] IN BLOOD BY AUTOMATED COUNT: 14.7 % (ref 11–15)
GLUCOSE SERPL-MCNC: 98 MG/DL (ref 65–99)
HCT VFR BLD AUTO: 49.2 % (ref 38.5–50)
HDLC SERPL-MCNC: 57 MG/DL
HGB BLD-MCNC: 16.5 G/DL (ref 13.2–17.1)
LDLC SERPL CALC-MCNC: 78 MG/DL (CALC)
LYMPHOCYTES # BLD AUTO: 1164 CELLS/UL (ref 850–3900)
LYMPHOCYTES NFR BLD AUTO: 19.4 %
MCH RBC QN AUTO: 31.1 PG (ref 27–33)
MCHC RBC AUTO-ENTMCNC: 33.5 G/DL (ref 32–36)
MCV RBC AUTO: 92.8 FL (ref 80–100)
MONOCYTES # BLD AUTO: 528 CELLS/UL (ref 200–950)
MONOCYTES NFR BLD AUTO: 8.8 %
NEUTROPHILS # BLD AUTO: 3780 CELLS/UL (ref 1500–7800)
NEUTROPHILS NFR BLD AUTO: 63 %
NONHDLC SERPL-MCNC: 98 MG/DL (CALC)
PLATELET # BLD AUTO: 297 THOUSAND/UL (ref 140–400)
PMV BLD REES-ECKER: 9.6 FL (ref 7.5–12.5)
POTASSIUM SERPL-SCNC: 4.7 MMOL/L (ref 3.5–5.3)
PROT SERPL-MCNC: 7.5 G/DL (ref 6.1–8.1)
PSA SERPL-MCNC: 2.6 NG/ML
RBC # BLD AUTO: 5.3 MILLION/UL (ref 4.2–5.8)
SODIUM SERPL-SCNC: 140 MMOL/L (ref 135–146)
TRIGL SERPL-MCNC: 110 MG/DL
TSH SERPL-ACNC: 2.59 MIU/L (ref 0.4–4.5)
VIT B12 SERPL-MCNC: 546 PG/ML (ref 200–1100)
WBC # BLD AUTO: 6 THOUSAND/UL (ref 3.8–10.8)

## 2025-07-23 NOTE — PROGRESS NOTES
Subjective   Reason for Visit: Anselmo Lin is an 74 y.o. male here for a Medicare Wellness visit.               HPI  The patient reports a long history of infrequent episodes of constipation manifested as a decrease in the frequency of bowel movements.  He reports no associated symptoms.  He reports that the episodes resolve within 24 hours of taking Ex-Lax.  The patient CT scan of the neck July 11, he has experienced no episodes of left-sided sore throat.  Over the past year, the patient reports no episodes of pain in the right shoulder or right knee.  He reports no other new complaints.    Labs obtained July 16 were reviewed  Patient Care Team:  Andrea Javier MD as PCP - General (Internal Medicine)  Andrea Javier MD as PCP - [facility]     Review of Systems  All systems have been reviewed and are normal except as previously noted  Objective   Vitals:  There were no vitals taken for this visit.      Physical Exam  Head-palpation revealed no tenderness over the maxillary or frontal sinuses.   Eyes - extraocular movements intact, pupils equal and reactive to light, fundi not well-visualized  Ears - palpation of pinnas and traguses revealed no tenderness. External auditory canals not erythematous or swollen. TMs clear.  Nose - turbinates not erythematous or swollen; mild nasal septal deviation noted to the left.   Mouth - posterior pharynx is erythematous, not swollen. Tonsillar pillars appeared normal no exudates  Neck - no lymphadenopathy. Thyroid gland not enlarged. , No bruits  Lungs - clear to auscultation bilaterally  Cardiac - rate normal, rhythm regular no murmurs no JVD  Abdomen - soft nondistended normal active bowel sounds.  There is a  6 x 6 cm mass in the epigastrium...  Palpation revealed no tenderness or increase in warmth.  No tenderness or masses noted throughout the rest of the abdomen.  Liver percussed to 9cm in total span  Pulses 2+ bilaterally  Extremities - no peripheral  edema    Skin - yellowish discoloration of right first toenail noted.  Varicose veins noted in the lower extremities bilaterally; palpation also revealed a nontender slightly movable 1 x 1 cm nodule located over the anterior surface of the right thigh ;no increase in warmth     Neurologic  Mental status-alert and oriented x3   Cranial nerves-2 through 12 grossly intact, no visual field abnormalities  Motor-no pronator drift noted, strength-5/5 in all muscle groups tested, , no tremor noted.  No bradykinesia noted.  No rigidity noted.  Negative pull test  Sensory-Light touch sensation fully intact  Pinprick sensation fully intact  Vibratory sensation mildly diminished in the first toes bilaterally equally  Cerebellar-no truncal ataxia, good coordination finger-nose testing,, good coordination heel-to-shin testing, normal rapid alternating movements  Romberg negative, no abnormality in tandem gait  Reflexes-2+/4 knee jerks, all other reflexes tested 1+/4 bilaterally  Assessment & Plan  Routine general medical examination at a health care facility    Orders:    ECG 12 Lead    Familial hypercholesterolemia         Benign prostatic hyperplasia with urinary obstruction         Benign essential microscopic hematuria         Gastroesophageal reflux disease without esophagitis                   Assessment  Elevated CT coronary calcium score  Left-sided nasal septal deviation  Acute sinusitis left maxillary sinus December 2021  Chronic bilateral maxillary sinusitis December 2021  Gastroesophageal reflux  Camaar's esophagus  Gastritis  Gilbert's syndrome  Chronic presence of a mass in the epigastrium-likely represents a ventral hernia  Colonic polyp August 2019  Long history of infrequent episodes of constipation-unsure of etiology.  Hemorrhoids  BPH status post laser treatment October 2017; urinary retention status post greenlight photo vaporization of the prostate February 17, 2023  UTI  5/23  Urolithiasis  Nephrolithiasis  Massive acute on chronic rotator cuff tear left shoulder status post arthroscopic repair April 2017.  Labrum tear right shoulder  Osteoarthritis of the acromioclavicular joint right shoulder  Osteoarthritis of the right knee  Anterior cruciate ligament tear right knee, meniscus tear right knee-remote  Vitamin D deficiency-mild  Chronic presence of a small mass located over the anterior surface of the right thigh-may represent a cyst or a prominent varicose vein  Hyperlipidemia  Varicose veins  Bilateral neuroforaminal stenosis L1-L2, L4-L5, L5-S1  Degenerative disc disease of the lumbosacral spine  Onychomycosis of the right first toenail     Plan  Obtain EKG and urinalysis today.  Continue current medication regimen for now  Patient should return for annual Medicare wellness visit in 1 year

## 2025-07-24 ENCOUNTER — APPOINTMENT (OUTPATIENT)
Dept: PRIMARY CARE | Facility: CLINIC | Age: 75
End: 2025-07-24
Payer: MEDICARE

## 2025-07-24 VITALS
SYSTOLIC BLOOD PRESSURE: 128 MMHG | HEART RATE: 82 BPM | DIASTOLIC BLOOD PRESSURE: 76 MMHG | BODY MASS INDEX: 25.13 KG/M2 | WEIGHT: 151 LBS

## 2025-07-24 DIAGNOSIS — R31.1 BENIGN ESSENTIAL MICROSCOPIC HEMATURIA: ICD-10-CM

## 2025-07-24 DIAGNOSIS — K21.9 GASTROESOPHAGEAL REFLUX DISEASE WITHOUT ESOPHAGITIS: ICD-10-CM

## 2025-07-24 DIAGNOSIS — Z00.00 ROUTINE GENERAL MEDICAL EXAMINATION AT A HEALTH CARE FACILITY: Primary | ICD-10-CM

## 2025-07-24 DIAGNOSIS — E78.01 FAMILIAL HYPERCHOLESTEROLEMIA: ICD-10-CM

## 2025-07-24 DIAGNOSIS — N13.8 BENIGN PROSTATIC HYPERPLASIA WITH URINARY OBSTRUCTION: ICD-10-CM

## 2025-07-24 DIAGNOSIS — N40.1 BENIGN PROSTATIC HYPERPLASIA WITH URINARY OBSTRUCTION: ICD-10-CM

## 2025-07-24 LAB
POC APPEARANCE, URINE: CLEAR
POC BILIRUBIN, URINE: NEGATIVE
POC BLOOD, URINE: NEGATIVE
POC COLOR, URINE: YELLOW
POC GLUCOSE, URINE: NEGATIVE MG/DL
POC KETONES, URINE: NEGATIVE MG/DL
POC LEUKOCYTES, URINE: NEGATIVE
POC NITRITE,URINE: NEGATIVE
POC PH, URINE: 6 PH
POC PROTEIN, URINE: ABNORMAL MG/DL
POC SPECIFIC GRAVITY, URINE: 1.02
POC UROBILINOGEN, URINE: 0.2 EU/DL

## 2025-07-24 PROCEDURE — G0439 PPPS, SUBSEQ VISIT: HCPCS | Performed by: INTERNAL MEDICINE

## 2025-07-24 PROCEDURE — 1036F TOBACCO NON-USER: CPT | Performed by: INTERNAL MEDICINE

## 2025-07-24 PROCEDURE — 1159F MED LIST DOCD IN RCRD: CPT | Performed by: INTERNAL MEDICINE

## 2025-07-24 PROCEDURE — 93000 ELECTROCARDIOGRAM COMPLETE: CPT | Performed by: INTERNAL MEDICINE

## 2025-07-24 PROCEDURE — 81002 URINALYSIS NONAUTO W/O SCOPE: CPT | Performed by: INTERNAL MEDICINE

## 2025-07-24 PROCEDURE — 1160F RVW MEDS BY RX/DR IN RCRD: CPT | Performed by: INTERNAL MEDICINE

## 2025-07-24 PROCEDURE — 1157F ADVNC CARE PLAN IN RCRD: CPT | Performed by: INTERNAL MEDICINE

## 2025-07-24 PROCEDURE — 1170F FXNL STATUS ASSESSED: CPT | Performed by: INTERNAL MEDICINE

## 2025-07-24 RX ORDER — ASPIRIN 81 MG/1
81 TABLET ORAL DAILY
COMMUNITY

## 2025-07-24 ASSESSMENT — ACTIVITIES OF DAILY LIVING (ADL)
DRESSING: INDEPENDENT
BATHING: INDEPENDENT
GROCERY_SHOPPING: INDEPENDENT
DOING_HOUSEWORK: INDEPENDENT
TAKING_MEDICATION: INDEPENDENT
MANAGING_FINANCES: INDEPENDENT

## 2025-07-24 ASSESSMENT — ENCOUNTER SYMPTOMS
DEPRESSION: 0
OCCASIONAL FEELINGS OF UNSTEADINESS: 0
LOSS OF SENSATION IN FEET: 0

## 2025-09-03 DIAGNOSIS — Z86.39 PERSONAL HISTORY OF OTHER ENDOCRINE, NUTRITIONAL AND METABOLIC DISEASE: ICD-10-CM

## 2025-09-03 RX ORDER — ATORVASTATIN CALCIUM 80 MG/1
80 TABLET, FILM COATED ORAL
Qty: 90 TABLET | Refills: 2 | Status: SHIPPED | OUTPATIENT
Start: 2025-09-03

## 2026-08-04 ENCOUNTER — APPOINTMENT (OUTPATIENT)
Dept: PRIMARY CARE | Facility: CLINIC | Age: 76
End: 2026-08-04
Payer: MEDICARE